# Patient Record
Sex: FEMALE | Race: WHITE | NOT HISPANIC OR LATINO | Employment: OTHER | URBAN - METROPOLITAN AREA
[De-identification: names, ages, dates, MRNs, and addresses within clinical notes are randomized per-mention and may not be internally consistent; named-entity substitution may affect disease eponyms.]

---

## 2024-08-16 NOTE — PROGRESS NOTES
Subjective   Patient ID: Sarah Gary is a 69 y.o. female who presents for Hearing Loss and Cerumen Impaction.  HPI  69-year-old female last seen by my previous associate in October 2022 is being seen in follow-up for otologic issues.  Past visits were mostly for ceruminous buildup.  Her ears were debrided at those past visits with no testing done.  There is been no history of vertigo or difficulties with pain or infection in the head and neck.  She did undergo cholecystectomy this past summer.  She is treated for hypercholesterolemia by her PCP.      Review of Systems  A 12 point ROS has been reviewed and are negative for complaint except what is stated in the history of present illness and/or past medical history as noted in the EMR  Active Ambulatory Problems     Diagnosis Date Noted    No Active Ambulatory Problems     Resolved Ambulatory Problems     Diagnosis Date Noted    No Resolved Ambulatory Problems     Past Medical History:   Diagnosis Date    Personal history of other endocrine, nutritional and metabolic disease        No current outpatient medications on file.     Social History     Tobacco Use    Smoking status: Never    Smokeless tobacco: Never   Substance Use Topics    Alcohol use: Not on file       Allergies   Allergen Reactions    Codeine Drowsiness    Penicillins Unknown       There were no vitals taken for this visit.    Objective   Physical Exam  EXAMINATION:    GENERAL RAMO.EARANCE: Alert, in no acute distress, normal pitch and clarity of voice, well-developed and nourished, cooperative.    HEAD/FACE: Normocephalic, atraumatic, normal facial movements and strength, no no tenderness to palpation, no lesions noted.    SKIN: Normal turgor, no raised or ulcerative lesions, warm and dry to palpation.    EYES: Extraocular motions intact, no nystagmus noted, pupils equal and reactive to light and accommodation, no conjunctivitis.    EARS: Both ears--external ear anatomy is normal without lesions,  auditory canals are patent and without skin abrasions or lesions, hearing is intact to voice, tympanic membranes are intact with no acute inflammation, light reflexes present, no effusions are noted and no mastoid tenderness found to palpation.    NOSE: No external skin lesions are noted, nares are patent, septum is intact, sinuses are nontender to palpation bilaterally, no intranasal lesions or inflammation is noted, nasal valve is normal.    OROPHARYNX/ORAL CAVITY: Oropharynx is not inflamed and is without lesions, mucosa of the oral cavity is intact and without lesions, tongue is midline and mobile, no acute dental disease is noted, TMJs are mobile    LUNG-- NO wheezing or rhonchi normal respiratory effort    HEART-- No venous congestion,  rate and rhythm regular,    NECK: No lymphadenopathy is palpated, carotid pulses are intact, neck is supple with full range of motion, no thyroid abnormalities are noted, trachea is midline, no neck masses are palpated.    LYMPHATICS: No cervical adenopathy or supraclavicular adenopathy is palpated.    NEUROLOGIC/PSYCH; alert and oriented, cranial nerves are grossly intact, gait is without falling, no motor deficits are noted.    Patient ID: Sarah Gary is a 69 y.o. female.    Binocular microscopy    Date/Time: 8/21/2024 11:14 AM    Performed by: Vivek King DMD, MD  Authorized by: Vivek King DMD, MD    Consent:     Consent obtained:  Verbal    Consent given by:  Patient    Risks discussed:  Pain    Alternatives discussed:  No treatment and observation  Post-procedure details:     Patient tolerance of procedure:  Tolerated well, no immediate complications  Comments:      Microscopic evaluation of the right ear was positive for dried epithelial debris around the ear canal opening.  There was also straight here down on the surface of the tympanic membrane and ear canal wall.  This was removed with cup forceps.  Left ear also had dried epithelial debris around the  canal opening.  No debris was noted on the tympanic membrane surface.  The middle ear structures appeared appropriate.      Her audiogram today revealed normal hearing in both ears up through 6000 Hz then a mild high-frequency hearing loss is noted.  Speech audiometry was normal tympanograms are were normal as were stapedial reflexes.  Assessment/Plan   Problem List Items Addressed This Visit    None  Visit Diagnoses         Codes    Dryness of both ear canals    -  Primary H61.893    Foreign body of right ear, initial encounter     T16.1XXA    Sensorineural hearing loss (SNHL) of both ears     H90.3          I discussed the clinical finds with the patient.  Her exam today was negative for any signs of inflammation and the sound that she heard could have been related to the matter in the ear canal.  There did not appear to be any suggestion of a middle ear problem and her hearing test was basically normal except for a very mild high-frequency hearing loss.  I encouraged her to stay well-hydrated and to avoid excessive noise without ear protection.  She can lubricate the areas around her outer ear to help minimize dryness.  She should avoid Q-tips and keep excessive water out of the ears.  If she notes any changes in hearing or difficulties with tinnitus or vertigo she should contact the office and be seen as she would if there is any nonclearing infections in the head neck or troubles with swallowing or voice.    This patient is advised to follow up with their PCP for all other health care issues and treatment. Dictation was done with dragon transcription and errors in spelling  and diction are possible.     Vivek King DMD, MD 08/21/24 11:20 AM

## 2024-08-21 ENCOUNTER — APPOINTMENT (OUTPATIENT)
Dept: OTOLARYNGOLOGY | Facility: CLINIC | Age: 69
End: 2024-08-21
Payer: MEDICARE

## 2024-08-21 ENCOUNTER — APPOINTMENT (OUTPATIENT)
Dept: AUDIOLOGY | Facility: CLINIC | Age: 69
End: 2024-08-21
Payer: MEDICARE

## 2024-08-21 DIAGNOSIS — T16.1XXA FOREIGN BODY OF RIGHT EAR, INITIAL ENCOUNTER: ICD-10-CM

## 2024-08-21 DIAGNOSIS — H61.893 DRYNESS OF BOTH EAR CANALS: Primary | ICD-10-CM

## 2024-08-21 DIAGNOSIS — H61.23 EXCESSIVE CERUMEN IN BOTH EAR CANALS: ICD-10-CM

## 2024-08-21 DIAGNOSIS — H90.3 SENSORINEURAL HEARING LOSS (SNHL) OF BOTH EARS: Primary | ICD-10-CM

## 2024-08-21 DIAGNOSIS — H90.3 SENSORINEURAL HEARING LOSS (SNHL) OF BOTH EARS: ICD-10-CM

## 2024-08-21 PROCEDURE — 1160F RVW MEDS BY RX/DR IN RCRD: CPT | Performed by: OTOLARYNGOLOGY

## 2024-08-21 PROCEDURE — 92557 COMPREHENSIVE HEARING TEST: CPT | Performed by: AUDIOLOGIST

## 2024-08-21 PROCEDURE — 1159F MED LIST DOCD IN RCRD: CPT | Performed by: OTOLARYNGOLOGY

## 2024-08-21 PROCEDURE — 92550 TYMPANOMETRY & REFLEX THRESH: CPT | Performed by: AUDIOLOGIST

## 2024-08-21 PROCEDURE — 92504 EAR MICROSCOPY EXAMINATION: CPT | Performed by: OTOLARYNGOLOGY

## 2024-08-21 PROCEDURE — 1036F TOBACCO NON-USER: CPT | Performed by: OTOLARYNGOLOGY

## 2024-08-21 PROCEDURE — 99214 OFFICE O/P EST MOD 30 MIN: CPT | Performed by: OTOLARYNGOLOGY

## 2024-08-21 NOTE — PROGRESS NOTES
[facility] AUDIOLOGY  AUDIOMETRIC EVALUATION      Name:  Sarah Gary   :  1955  Age:  69 y.o.  Date of Evaluation:  24    HISTORY    Sarah Gary is seen today at the request of Vivek King M.D., ABDELRAHMAN., F.A.C.S.  The patient is  an established patient having her ears cleaned.      EVALUATION  See scanned audiogram in Media and included at the end of this report.    RESULTS    Otoscopic Evaluation:  The patient had their ears cleaned prior to the audiometric examination.    Tympanometry:   Right Ear:  Type A, consistent with normal eardrum mobility and middle ear pressure   Left Ear:  Type A, consistent with normal eardrum mobility and middle ear pressure    Acoustic reflexes were absent at 4k Hz.    Pure Tone Audiometry:    Right Ear:  normal to mild sensorineural hearing loss  Left Ear:  normal to mild sensorineural hearing loss       Speech Audiometry:    Right Ear:  excellent in quiet at a normal presentation level  Left Ear:  excellent in quiet at a normal presentation level  Speech reception thresholds were in good agreement with pure tone testing.    DISCUSSION  Results were relayed to Vivek King M.D., ABDELRAHMAN., F.A.C.S.    APPOINTMENT TIME  11:30am-12:00pm     Ramesh Chacon  Doctor of Audiology  Senior Audiologist